# Patient Record
Sex: MALE | Race: WHITE | NOT HISPANIC OR LATINO | Employment: OTHER | URBAN - METROPOLITAN AREA
[De-identification: names, ages, dates, MRNs, and addresses within clinical notes are randomized per-mention and may not be internally consistent; named-entity substitution may affect disease eponyms.]

---

## 2017-03-06 RX ORDER — METOPROLOL SUCCINATE 50 MG/1
50 TABLET, EXTENDED RELEASE ORAL 2 TIMES DAILY
Qty: 180 TABLET | Refills: 0 | Status: SHIPPED | OUTPATIENT
Start: 2017-03-06 | End: 2017-04-05

## 2017-03-09 RX ORDER — METOPROLOL TARTRATE 50 MG/1
TABLET ORAL
Qty: 60 TABLET | Refills: 2 | Status: SHIPPED | OUTPATIENT
Start: 2017-03-09 | End: 2017-06-05 | Stop reason: SDUPTHER

## 2017-04-04 PROBLEM — I10 ESSENTIAL HYPERTENSION: Status: ACTIVE | Noted: 2017-04-04

## 2017-04-04 PROBLEM — I25.10 CORONARY ARTERY DISEASE INVOLVING NATIVE CORONARY ARTERY OF NATIVE HEART WITHOUT ANGINA PECTORIS: Status: ACTIVE | Noted: 2017-04-04

## 2017-04-04 PROBLEM — Z95.3 STATUS POST MITRAL VALVE REPLACEMENT WITH BIOPROSTHETIC VALVE: Status: ACTIVE | Noted: 2017-04-04

## 2017-04-04 PROBLEM — I05.9 MITRAL VALVE DISEASE: Status: ACTIVE | Noted: 2017-04-04

## 2017-04-04 PROBLEM — I77.9 CAROTID ARTERY DISEASE: Status: ACTIVE | Noted: 2017-04-04

## 2017-04-04 PROBLEM — E78.5 DYSLIPIDEMIA: Status: ACTIVE | Noted: 2017-04-04

## 2017-04-05 ENCOUNTER — OFFICE VISIT (OUTPATIENT)
Dept: CARDIOLOGY | Facility: CLINIC | Age: 72
End: 2017-04-05
Payer: MEDICARE

## 2017-04-05 VITALS
BODY MASS INDEX: 27.99 KG/M2 | DIASTOLIC BLOOD PRESSURE: 70 MMHG | WEIGHT: 189 LBS | HEIGHT: 69 IN | SYSTOLIC BLOOD PRESSURE: 116 MMHG | HEART RATE: 57 BPM

## 2017-04-05 DIAGNOSIS — I25.10 CORONARY ARTERY DISEASE INVOLVING NATIVE CORONARY ARTERY OF NATIVE HEART WITHOUT ANGINA PECTORIS: Primary | ICD-10-CM

## 2017-04-05 DIAGNOSIS — I10 ESSENTIAL HYPERTENSION: ICD-10-CM

## 2017-04-05 DIAGNOSIS — E78.5 DYSLIPIDEMIA: ICD-10-CM

## 2017-04-05 DIAGNOSIS — I05.9 MITRAL VALVE DISEASE: ICD-10-CM

## 2017-04-05 PROCEDURE — 99214 OFFICE O/P EST MOD 30 MIN: CPT | Mod: S$GLB,,, | Performed by: INTERNAL MEDICINE

## 2017-04-05 RX ORDER — AMLODIPINE BESYLATE 5 MG/1
5 TABLET ORAL NIGHTLY
Qty: 90 TABLET | Refills: 1 | Status: SHIPPED | OUTPATIENT
Start: 2017-04-05 | End: 2017-12-21 | Stop reason: SDUPTHER

## 2017-04-05 RX ORDER — FENOFIBRATE 134 MG/1
134 CAPSULE ORAL
COMMUNITY
End: 2021-10-24 | Stop reason: CLARIF

## 2017-04-05 RX ORDER — LISINOPRIL 40 MG/1
40 TABLET ORAL NIGHTLY
Qty: 90 TABLET | Refills: 1 | Status: SHIPPED | OUTPATIENT
Start: 2017-04-05 | End: 2017-09-18 | Stop reason: SDUPTHER

## 2017-04-05 RX ORDER — ATORVASTATIN CALCIUM 20 MG/1
20 TABLET, FILM COATED ORAL NIGHTLY
COMMUNITY
End: 2021-10-24 | Stop reason: CLARIF

## 2017-04-05 NOTE — MR AVS SNAPSHOT
[facility]  [address] [facility] [address]  Phone: [phone]                  Demarco Mercado   2017 3:15 PM   Office Visit    Description:  Male : 1945   Provider:  Wilmer Higginbotham MD   Department:  [facility]           Reason for Visit     Coronary Artery Disease     Valvular Heart Disease     Hyperlipidemia           Diagnoses this Visit        Comments    Coronary artery disease involving native coronary artery of native heart without angina pectoris    -  Primary     Essential hypertension         Mitral valve disease         Dyslipidemia                To Do List           Goals (5 Years of Data)     None      Follow-Up and Disposition     Return in about 6 months (around 10/5/2017).    Follow-up and Disposition History       These Medications        Disp Refills Start End    amlodipine (NORVASC) 5 MG tablet 90 tablet 1 2017     Take 1 tablet (5 mg total) by mouth every evening. - Oral    Pharmacy: [facility] [id number] [place] [address] AT [facility] Ph #: [phone]       lisinopril (PRINIVIL,ZESTRIL) 40 MG tablet 90 tablet 1 2017     Take 1 tablet (40 mg total) by mouth every evening. - Oral    Pharmacy: [facility] [place] [id number] [place] [address] AT [facility] Ph #: [phone]         [facility]     [facility] Care Line - 24/ Assistance  Unless otherwise directed by your provider, please contact [facility], our nurse care line that is available for 24/ assistance.     Registered nurses in the [facility] provide: appointment scheduling, clinical advisement, health education, and other advisory services.  Call: [phone] (toll free)               Medications           CHANGE how you are taking these medications     Start Taking Instead of    amlodipine (NORVASC) 5 MG tablet amlodipine (NORVASC) 5 MG tablet     "Dosage:  Take 1 tablet (5 mg total) by mouth every evening. Dosage:  Take 5 mg by mouth every evening.    Reason for Change:  Reorder     lisinopril (PRINIVIL,ZESTRIL) 40 MG tablet lisinopril (PRINIVIL,ZESTRIL) 20 MG tablet    Dosage:  Take 1 tablet (40 mg total) by mouth every evening. Dosage:  Take 20 mg by mouth 2 (two) times daily.    Reason for Change:  Reorder       STOP taking these medications     metoprolol succinate (TOPROL-XL) 50 MG 24 hr tablet Take 1 tablet (50 mg total) by mouth 2 (two) times daily. TAKE AM OF SURGERY           Verify that the below list of medications is an accurate representation of the medications you are currently taking.  If none reported, the list may be blank. If incorrect, please contact your healthcare provider. Carry this list with you in case of emergency.           Current Medications     alprazolam (XANAX) 0.5 MG tablet Has ample supply at home.    amlodipine (NORVASC) 5 MG tablet Take 1 tablet (5 mg total) by mouth every evening.    aspirin (ECOTRIN) 325 MG EC tablet Take 325 mg by mouth once daily.    atorvastatin (LIPITOR) 20 MG tablet Take 20 mg by mouth once daily.    esomeprazole (NEXIUM) 40 MG capsule Take 40 mg by mouth before breakfast. TAKE AM OF SURGERY    fenofibrate micronized (LOFIBRA) 134 MG Cap Take 134 mg by mouth daily with breakfast.    krill-om3-dha-epa-om6-lip-astx (KRILL OIL, OMEGA 3 AND 6,) 1,500-165-67.5 mg Cap Take 1 capsule by mouth once daily.    lisinopril (PRINIVIL,ZESTRIL) 40 MG tablet Take 1 tablet (40 mg total) by mouth every evening.    metoprolol tartrate (LOPRESSOR) 50 MG tablet TAKE 1 TABLET BY MOUTH TWICE DAILY           Clinical Reference Information           Your Vitals Were     BP Pulse Height Weight BMI    116/70 57 5' 9" (1.753 m) 85.7 kg (189 lb) 27.91 kg/m2      Blood Pressure          Most Recent Value    BP  116/70      Allergies as of 4/5/2017     No Known Allergies      Immunizations Administered on Date of Encounter - " "4/5/2017     None      Orders Placed During Today's Visit     Future Labs/Procedures Expected by Expires    Comprehensive metabolic panel  4/5/2017 6/4/2018    Lipid panel  4/5/2017 6/4/2018      Instructions      Controlling Your Cholesterol  Cholesterol is a waxy substance. It travels in your blood through the blood vessels. When you have high cholesterol, it builds up in the walls of the blood vessels. This makes the vessels narrower. Blood flow decreases. You are then at greater risk for having a heart attack or a stroke.  Good and bad cholesterol  Lipids are fats. Blood is mostly water. Fat and water don't mix. So our bodies need lipoproteins (lipids inside a protein shell) to carry the lipids. The protein shell carries its lipids through the bloodstream. There are two main kinds of lipoproteins:  · LDL (low-density lipoprotein) is known as "bad cholesterol." It mainly carries cholesterol. It delivers this cholesterol to body cells. Excess LDL cholesterol will build up in artery walls. This increases your risk for heart disease and stroke.  · HDL (high-density lipoprotein) is known as "good cholesterol." This protein shell collects excess cholesterol that LDLs have left behind on blood vessel walls. That's why high levels of HDL cholesterol can decrease your risk of heart disease and stroke.  Controlling cholesterol levels  Total cholesterol includes LDL and HDL cholesterol, as well as other fats in the bloodstream. If your total cholesterol is high, follow the steps below to help lower your total cholesterol level:  · Eat less unhealthy fat:  ¨ Cut back on saturated fats and trans (also called hydrogenated) fats by selecting lean cuts of meat, low-fat dairy, and using oils instead of solid fats. Limit baked goods, processed meats, and fried foods. A diet thats high in these fats increases your bad cholesterol. It's not enough to just cut back on foods containing cholesterol.  ¨ Eat about 2 servings of fish " per week. Most fish contain omega-3 fatty acids. These help lower blood cholesterol.  ¨ Eat more whole grains and soluble fiber (such as oat bran). These lower overall cholesterol.  · Be active:  ¨ Choose an activity you enjoy. Walking, swimming, and riding a bike are some good ways to be active.  ¨ Start at a level where you feel comfortable. Increase your time and pace a little each week.  ¨ Work up to 40 minutes of moderate to high intensity physical activity at least 3 to 4 days per week.  ¨ Remember, some activity is better than none.  ¨ If you haven't been exercising regularly, start slowly. Check with your doctor to make sure the exercise plan is right for you.  · Quit smoking. Quitting smoking can improve your lipid levels. It also lowers your risk for heart disease and stroke.  · Weight management. If you are overweight or obese, your health care provider will work with you to lose weight and lower your BMI (body mass index) to a normal or near-normal level. Making diet changes and increasing physical activity can help.  · Take medication as directed. Many people need medication to get their LDL levels to a safe level. Medication to lower cholesterol levels is effective and safe. (But taking medication is not a substitute for exercise or watching your diet!) Your doctor can tell you whether you might benefit from a cholesterol-lowering medication.  Date Last Reviewed: 5/11/2015 © 2000-2016 [employer]. [address] [place] [id number]. All rights reserved. This information is not intended as a substitute for professional medical care. Always follow your healthcare professional's instructions.        Heart Disease Education    The heart beats 60 to 100 times per minute, 24 hours a day. This equals almost 1000,000 times a day. It pumps blood with oxygen and nutrients to the tissues and organs of the body. But the heart is a muscle and needs its own supply of blood. Blood flow to the  heart is supplied by the coronary arteries. Coronary artery disease (atherosclerosis) is a result of cholesterol, saturated fat, and calcium deposits (plaques) that build up inside the walls. This causes inflammation within the coronary arteries. These plaques narrow the artery and reduce blood flow to the heart muscle. The reduction in blood flow to the heart muscle decreases oxygen supply to the heart. If the narrowing is significant enough, the oxygen supply to one or more regions of the heart can be temporarily or permanently shut down. This can cause chest pain, and possibly death of heart tissue (heart attack).  Types of chest pain  Angina is the name for pain in the heart muscle. Angina is a warning sign of serious heart disease. When untreated it can lead to a heart attack, also known as acute myocardial infarction, or AMI. Angina occurs when there is not enough blood and oxygen flowing to the heart for the amount of work it is doing. This most often happens during physical exertion, when the heart is working hardest. It is usually relieved by rest or nitroglycerin. Angina may also occur after a large meal when extra blood is sent to the digestive organs and less goes to the heart. In the case of advanced or unstable heart disease, angina can occur at rest or awaken you from sleep. Angina usually lasts from a few minutes up to 20 minutes or more. When treated early, the effects of angina can be reversed without permanent damage to the heart. Angina is a serious condition and needs to be evaluated by a medical professional immediately.  There are two types of angina -- stable and unstable:  · Stable angina usually occurs with a predictable level of activity. Being stable, its character, severity, and occurrence do not change much over time. It usually starts with activity, and resolves with rest or taking your medicine as instructed by your doctor. The symptoms usually do not last long.  · Unstable angina  "changes or gets worse over time. It is different from whatever you are used to. It may feel different or worse, begin without cause, occur with exercise or exertion, wake you up from sleep, and last longer. It may not respond in the same way as it does when you take your usual medicines for an attack. This type of angina can be a warning sign of an impending heart attack.     A heart attack is usually the result of a blood clot that suddenly forms in a coronary artery that has been narrowed with plaque. When this occurs, blood flow may be cut off to a part of the heart muscle, causing the cells to die. This weakens the pumping action of the heart, which affects the delivery of blood to all the other organs in the body including the brain. This damage is not reversible. However, early treatment can limit the amount of damage.  The pain you feel with angina and a heart attack may have a similar quality. However, it is usually different in intensity and duration. Here are some typical descriptions of a heart attack:  · It is most often experienced as a squeezing, crushing, pressure-like sensation in the center of the chest.  · It is sometimes described as something heavy sitting on my chest.  · It may feel more like a bad case of indigestion.  · The pain may spread from the chest to the arm, shoulder, throat or jaw.  · Sometimes the pain is not felt in the chest at all, but only in the arm, shoulder, throat or jaw.  · There may also be nausea, vomiting, dizziness or light-headedness, sweating and trouble breathing.  · Palpitations, or your heart beating rapidly  · A new, irregular heart beat  · Unexplained weakness  You may not be able to tell the difference between "bad" angina and a heart attack at home. Seek help if your symptoms are different than usual. Do not be in denial or just try to "tough it out."  Call 911  This is the fastest and safest way to get to the emergency department. The paramedics can also " start treatment on the way to the hospital, saving valuable time for your heart.  · If the angina gets worse, if it continues, or if it stops and returns, call 911 immediately. Do not delay. You may be having a heart attack.  · After you call 911, take a second tablet or spray unless instructed otherwise. When repeating doses, sit down if possible, because it can make you feel lightheaded or dizzy. Wait another 5 minutes. If the angina still does not go away, take a third tablet or spray. Do not take more than 3 tablets or sprays within 15 minutes. Stay on the phone with 911 for further instruction.  · Your healthcare provider may give you slightly different instructions than those above. If so, follow them carefully.  Do not wait until symptoms become severe to call 911.  Other reasons to call 911 include:  · Trouble breathing  · Feeling lightheaded, faint, or dizzy  · Rapid heart beat  · Slower than usual heart rate compared to your normal  · Angina with weakness, dizziness, fainting, heavy sweating, nausea, or vomiting  · Extreme drowsiness, confusion  · Weakness of an arm or leg or one side of the face  · Difficulty with speech or vision  When to seek medical care  Remember, the signs and symptoms of a heart attack are not always like they are on TV. Sometimes they are not so obvious. You may only feel weak, or just not right. If it is not clear or if you have any doubt, call for advice.  · Seek help if there is a change in the type of pain, if it feels different, or if your symptoms are mild.  · Do not drive yourself. Have someone else drive you. If no one can drive, call 911.  · Do not delay. Fast diagnosis and treatment can prevent or limit the amount of heart damage during a heart attack.  · Do not go to your doctor's office or a clinic as they may not be able to provide all the testing and treatment required for this condition.  · If your doctor has given you medicine to take when symptoms occur, take them  "but don't delay getting help trying to locate medicines.  What happens in the emergency department  The emergency department is connected to your local emergency medical system (EMS) through 911. That's why during a cardiac emergency, calling 911 is the fastest way to get help. The goal of the emergency department is to rapidly screen, evaluate, and treat people.  Once you are there, an electrocardiogram (ECG or heart tracing) will be done. Blood samples may be taken to look for the presence of heart enzymes that leak from damaged heart cells and show if a heart attack is occurring. You will often be evaluated by a heart specialist (cardiologist) who decides the best course of action. In the case of severe angina or early heart attack, and depending on the circumstances, powerful "clot busting" medicines can be used to dissolve blood clots in the coronary artery. In other cases, you may be taken to a cardiac catheterization lab. Here, a tiny balloon-tipped catheter is advanced through blood vessels to the heart. There the balloon is inflated pushing open the blood vessel restoring blood flow.  Risk factors for heart disease  Risk factors for heart disease are a combination of genetic and lifestyle. Many risk factors work by either directly or indirectly damaging the blood vessels of the heart, or by increasing the risk of forming blood or cholesterol clots, which then clog up and block the arteries.     Examples of physical lifestyle risk factors:  · Cigarette smoking  · High blood pressure  · High blood cholesterol  · Use of stimulant drugs such as cocaine, crack, and amphetamines  · Eating a high-fat, high-cholesterol meal  · Diabetes   · Obesity which increases risk for diabetes and high blood pressure  · Lack of regular physical activity     Examples of emotional lifestyle factors:  · Chronic high stress levels release stress hormones. These raise blood pressure and cholesterol level and makes blood clot more " easily.  · Held-in anger, hostile or cynical attitude  · Social and emotional isolation, lack of intimacy  · Loss of relationship  · Depression  Other factors that increase the risk of heart attack that you cannot control :  · Age. The older you get beyond 40, the greater is your risk of significant coronary artery disease.  · Gender. More men than women get heart disease; but once past menopause, women who are not taking estrogen replacement have the same risk as men for a heart attack.  · Family history. If your mother, father, brother or sister has coronary artery disease, your risk of having it is higher than a person your age without this family history.  What can you do to decrease your risk  To reduce your risk of heart disease:  · Get regular checkups with your doctor.  · Take your medicines for blood pressure, cholesterol or diabetes as directed.  · Watch your diet. Eat a heart healthy diet choosing fresh foods, less salt, cholesterol, and fat  · Stop smoking. Get help if needed.  · Get regular exercise.  · Manage stress.  · Carry a list of medicines and doses in your wallet.  Date Last Reviewed: 12/30/2015  © 8729-9459 [employer]. [address] [place] [address]. All rights reserved. This information is not intended as a substitute for professional medical care. Always follow your healthcare professional's instructions.        Heart Valve Problems  Your hearts job is to pump blood through your body. That job starts with pumping blood through the heart itself. Inside your heart, blood passes through a series of one-way cotto called valves. If a valve works poorly, not enough blood moves forward. A problem heart valve may not open wide enough, not close tightly enough, or both. In any case, not enough blood is sent to the heart muscle or out to the body.  Symptoms of heart valve problems  You can have a problem valve for decades yet have no symptoms. If you do have symptoms, they  may come on so slowly that you barely notice them. In other cases, though, symptoms appear suddenly. You might have one or more of these symptoms:  · Problems breathing when you lie down, exert yourself, or get stressed emotionally  · Pain, pressure, tightness, or numbness in your chest, neck, back, or arms (angina)  · Feeling dizzy, faint, or lightheaded  · Tiredness, especially with activity or as the day goes on  · Waking up at night coughing or short of breath  · A fast, pounding, or irregular heartbeat  · A fluttering feeling in your chest  · Swollen ankles or feet  · Fainting, especially upon standing up or with exertion  Problems opening (stenosis)    When a valve doesnt open all the way, the problem is called stenosis. The leaflets may be stuck together or too stiff to open fully. When the valve doesnt open fully, blood has to flow through a smaller opening. So the heart muscle has to work harder to push the blood through the valve.  Problems closing (regurgitation)    When a valve doesnt close tightly enough and blood leaks backward through the valve, the problem is called regurgitation or insufficiency. The valve itself may be described as leaky. Leaflets may fit together poorly. Or the structures that support them may be torn. Some blood leaks through the valve back into the chamber it just left. So the heart has to move that blood twice. This can result in heart muscle damage.  Common causes of valve problems  People of any age can have heart valve trouble. You may have been born with a problem valve. Or a valve may have worn out as youve aged. It may not be possible to pinpoint what caused your valve problem. But common causes include:  · Buildup of calcium or scar tissue on a valve  · Rheumatic fever and certain other infections and diseases  · High blood pressure  · Other heart problems, such as coronary artery disease  · Congenital defects of the heart valves      Date Last Reviewed: 6/1/2016  ©  8769-2291 [employer]. [address] [place] [address], [place] [address]. All rights reserved. This information is not intended as a substitute for professional medical care. Always follow your healthcare professional's instructions.             Language Assistance Services     ATTENTION: Language assistance services are available, free of charge. Please call [phone].      ATENCIÓN: Si habla español, tiene a varela disposición servicios gratuitos de asistencia lingüística. Llame al [phone].     CHÚ Ý: N?u b?n nói Ti?ng Vi?t, có các d?ch v? h? tr? ngôn ng? mi?n phí dành cho b?n. G?i s? [phone].         [facility] complies with applicable Federal civil rights laws and does not discriminate on the basis of race, color, national origin, age, disability, or sex.

## 2017-04-05 NOTE — PATIENT INSTRUCTIONS
"  Controlling Your Cholesterol  Cholesterol is a waxy substance. It travels in your blood through the blood vessels. When you have high cholesterol, it builds up in the walls of the blood vessels. This makes the vessels narrower. Blood flow decreases. You are then at greater risk for having a heart attack or a stroke.  Good and bad cholesterol  Lipids are fats. Blood is mostly water. Fat and water don't mix. So our bodies need lipoproteins (lipids inside a protein shell) to carry the lipids. The protein shell carries its lipids through the bloodstream. There are two main kinds of lipoproteins:  · LDL (low-density lipoprotein) is known as "bad cholesterol." It mainly carries cholesterol. It delivers this cholesterol to body cells. Excess LDL cholesterol will build up in artery walls. This increases your risk for heart disease and stroke.  · HDL (high-density lipoprotein) is known as "good cholesterol." This protein shell collects excess cholesterol that LDLs have left behind on blood vessel walls. That's why high levels of HDL cholesterol can decrease your risk of heart disease and stroke.  Controlling cholesterol levels  Total cholesterol includes LDL and HDL cholesterol, as well as other fats in the bloodstream. If your total cholesterol is high, follow the steps below to help lower your total cholesterol level:  · Eat less unhealthy fat:  ¨ Cut back on saturated fats and trans (also called hydrogenated) fats by selecting lean cuts of meat, low-fat dairy, and using oils instead of solid fats. Limit baked goods, processed meats, and fried foods. A diet thats high in these fats increases your bad cholesterol. It's not enough to just cut back on foods containing cholesterol.  ¨ Eat about 2 servings of fish per week. Most fish contain omega-3 fatty acids. These help lower blood cholesterol.  ¨ Eat more whole grains and soluble fiber (such as oat bran). These lower overall cholesterol.  · Be active:  ¨ Choose an " activity you enjoy. Walking, swimming, and riding a bike are some good ways to be active.  ¨ Start at a level where you feel comfortable. Increase your time and pace a little each week.  ¨ Work up to 40 minutes of moderate to high intensity physical activity at least 3 to 4 days per week.  ¨ Remember, some activity is better than none.  ¨ If you haven't been exercising regularly, start slowly. Check with your doctor to make sure the exercise plan is right for you.  · Quit smoking. Quitting smoking can improve your lipid levels. It also lowers your risk for heart disease and stroke.  · Weight management. If you are overweight or obese, your health care provider will work with you to lose weight and lower your BMI (body mass index) to a normal or near-normal level. Making diet changes and increasing physical activity can help.  · Take medication as directed. Many people need medication to get their LDL levels to a safe level. Medication to lower cholesterol levels is effective and safe. (But taking medication is not a substitute for exercise or watching your diet!) Your doctor can tell you whether you might benefit from a cholesterol-lowering medication.  Date Last Reviewed: 5/11/2015  © 4478-4800 [employer]. [address] [place] [address], [place] [address]. All rights reserved. This information is not intended as a substitute for professional medical care. Always follow your healthcare professional's instructions.        Heart Disease Education    The heart beats 60 to 100 times per minute, 24 hours a day. This equals almost 1000,000 times a day. It pumps blood with oxygen and nutrients to the tissues and organs of the body. But the heart is a muscle and needs its own supply of blood. Blood flow to the heart is supplied by the coronary arteries. Coronary artery disease (atherosclerosis) is a result of cholesterol, saturated fat, and calcium deposits (plaques) that build up inside the walls. This causes  inflammation within the coronary arteries. These plaques narrow the artery and reduce blood flow to the heart muscle. The reduction in blood flow to the heart muscle decreases oxygen supply to the heart. If the narrowing is significant enough, the oxygen supply to one or more regions of the heart can be temporarily or permanently shut down. This can cause chest pain, and possibly death of heart tissue (heart attack).  Types of chest pain  Angina is the name for pain in the heart muscle. Angina is a warning sign of serious heart disease. When untreated it can lead to a heart attack, also known as acute myocardial infarction, or AMI. Angina occurs when there is not enough blood and oxygen flowing to the heart for the amount of work it is doing. This most often happens during physical exertion, when the heart is working hardest. It is usually relieved by rest or nitroglycerin. Angina may also occur after a large meal when extra blood is sent to the digestive organs and less goes to the heart. In the case of advanced or unstable heart disease, angina can occur at rest or awaken you from sleep. Angina usually lasts from a few minutes up to 20 minutes or more. When treated early, the effects of angina can be reversed without permanent damage to the heart. Angina is a serious condition and needs to be evaluated by a medical professional immediately.  There are two types of angina -- stable and unstable:  · Stable angina usually occurs with a predictable level of activity. Being stable, its character, severity, and occurrence do not change much over time. It usually starts with activity, and resolves with rest or taking your medicine as instructed by your doctor. The symptoms usually do not last long.  · Unstable angina changes or gets worse over time. It is different from whatever you are used to. It may feel different or worse, begin without cause, occur with exercise or exertion, wake you up from sleep, and last longer.  "It may not respond in the same way as it does when you take your usual medicines for an attack. This type of angina can be a warning sign of an impending heart attack.     A heart attack is usually the result of a blood clot that suddenly forms in a coronary artery that has been narrowed with plaque. When this occurs, blood flow may be cut off to a part of the heart muscle, causing the cells to die. This weakens the pumping action of the heart, which affects the delivery of blood to all the other organs in the body including the brain. This damage is not reversible. However, early treatment can limit the amount of damage.  The pain you feel with angina and a heart attack may have a similar quality. However, it is usually different in intensity and duration. Here are some typical descriptions of a heart attack:  · It is most often experienced as a squeezing, crushing, pressure-like sensation in the center of the chest.  · It is sometimes described as something heavy sitting on my chest.  · It may feel more like a bad case of indigestion.  · The pain may spread from the chest to the arm, shoulder, throat or jaw.  · Sometimes the pain is not felt in the chest at all, but only in the arm, shoulder, throat or jaw.  · There may also be nausea, vomiting, dizziness or light-headedness, sweating and trouble breathing.  · Palpitations, or your heart beating rapidly  · A new, irregular heart beat  · Unexplained weakness  You may not be able to tell the difference between "bad" angina and a heart attack at home. Seek help if your symptoms are different than usual. Do not be in denial or just try to "tough it out."  Call 911  This is the fastest and safest way to get to the emergency department. The paramedics can also start treatment on the way to the hospital, saving valuable time for your heart.  · If the angina gets worse, if it continues, or if it stops and returns, call 911 immediately. Do not delay. You may be having a " heart attack.  · After you call 911, take a second tablet or spray unless instructed otherwise. When repeating doses, sit down if possible, because it can make you feel lightheaded or dizzy. Wait another 5 minutes. If the angina still does not go away, take a third tablet or spray. Do not take more than 3 tablets or sprays within 15 minutes. Stay on the phone with 911 for further instruction.  · Your healthcare provider may give you slightly different instructions than those above. If so, follow them carefully.  Do not wait until symptoms become severe to call 911.  Other reasons to call 911 include:  · Trouble breathing  · Feeling lightheaded, faint, or dizzy  · Rapid heart beat  · Slower than usual heart rate compared to your normal  · Angina with weakness, dizziness, fainting, heavy sweating, nausea, or vomiting  · Extreme drowsiness, confusion  · Weakness of an arm or leg or one side of the face  · Difficulty with speech or vision  When to seek medical care  Remember, the signs and symptoms of a heart attack are not always like they are on TV. Sometimes they are not so obvious. You may only feel weak, or just not right. If it is not clear or if you have any doubt, call for advice.  · Seek help if there is a change in the type of pain, if it feels different, or if your symptoms are mild.  · Do not drive yourself. Have someone else drive you. If no one can drive, call 911.  · Do not delay. Fast diagnosis and treatment can prevent or limit the amount of heart damage during a heart attack.  · Do not go to your doctor's office or a clinic as they may not be able to provide all the testing and treatment required for this condition.  · If your doctor has given you medicine to take when symptoms occur, take them but don't delay getting help trying to locate medicines.  What happens in the emergency department  The emergency department is connected to your local emergency medical system (EMS) through 911. That's why  "during a cardiac emergency, calling 911 is the fastest way to get help. The goal of the emergency department is to rapidly screen, evaluate, and treat people.  Once you are there, an electrocardiogram (ECG or heart tracing) will be done. Blood samples may be taken to look for the presence of heart enzymes that leak from damaged heart cells and show if a heart attack is occurring. You will often be evaluated by a heart specialist (cardiologist) who decides the best course of action. In the case of severe angina or early heart attack, and depending on the circumstances, powerful "clot busting" medicines can be used to dissolve blood clots in the coronary artery. In other cases, you may be taken to a cardiac catheterization lab. Here, a tiny balloon-tipped catheter is advanced through blood vessels to the heart. There the balloon is inflated pushing open the blood vessel restoring blood flow.  Risk factors for heart disease  Risk factors for heart disease are a combination of genetic and lifestyle. Many risk factors work by either directly or indirectly damaging the blood vessels of the heart, or by increasing the risk of forming blood or cholesterol clots, which then clog up and block the arteries.     Examples of physical lifestyle risk factors:  · Cigarette smoking  · High blood pressure  · High blood cholesterol  · Use of stimulant drugs such as cocaine, crack, and amphetamines  · Eating a high-fat, high-cholesterol meal  · Diabetes   · Obesity which increases risk for diabetes and high blood pressure  · Lack of regular physical activity     Examples of emotional lifestyle factors:  · Chronic high stress levels release stress hormones. These raise blood pressure and cholesterol level and makes blood clot more easily.  · Held-in anger, hostile or cynical attitude  · Social and emotional isolation, lack of intimacy  · Loss of relationship  · Depression  Other factors that increase the risk of heart attack that you " cannot control :  · Age. The older you get beyond 40, the greater is your risk of significant coronary artery disease.  · Gender. More men than women get heart disease; but once past menopause, women who are not taking estrogen replacement have the same risk as men for a heart attack.  · Family history. If your mother, father, brother or sister has coronary artery disease, your risk of having it is higher than a person your age without this family history.  What can you do to decrease your risk  To reduce your risk of heart disease:  · Get regular checkups with your doctor.  · Take your medicines for blood pressure, cholesterol or diabetes as directed.  · Watch your diet. Eat a heart healthy diet choosing fresh foods, less salt, cholesterol, and fat  · Stop smoking. Get help if needed.  · Get regular exercise.  · Manage stress.  · Carry a list of medicines and doses in your wallet.  Date Last Reviewed: 12/30/2015  © 6269-1012 [employer] [address] [place] [address]. All rights reserved. This information is not intended as a substitute for professional medical care. Always follow your healthcare professional's instructions.        Heart Valve Problems  Your hearts job is to pump blood through your body. That job starts with pumping blood through the heart itself. Inside your heart, blood passes through a series of one-way cotto called valves. If a valve works poorly, not enough blood moves forward. A problem heart valve may not open wide enough, not close tightly enough, or both. In any case, not enough blood is sent to the heart muscle or out to the body.  Symptoms of heart valve problems  You can have a problem valve for decades yet have no symptoms. If you do have symptoms, they may come on so slowly that you barely notice them. In other cases, though, symptoms appear suddenly. You might have one or more of these symptoms:  · Problems breathing when you lie down, exert yourself, or  get stressed emotionally  · Pain, pressure, tightness, or numbness in your chest, neck, back, or arms (angina)  · Feeling dizzy, faint, or lightheaded  · Tiredness, especially with activity or as the day goes on  · Waking up at night coughing or short of breath  · A fast, pounding, or irregular heartbeat  · A fluttering feeling in your chest  · Swollen ankles or feet  · Fainting, especially upon standing up or with exertion  Problems opening (stenosis)    When a valve doesnt open all the way, the problem is called stenosis. The leaflets may be stuck together or too stiff to open fully. When the valve doesnt open fully, blood has to flow through a smaller opening. So the heart muscle has to work harder to push the blood through the valve.  Problems closing (regurgitation)    When a valve doesnt close tightly enough and blood leaks backward through the valve, the problem is called regurgitation or insufficiency. The valve itself may be described as leaky. Leaflets may fit together poorly. Or the structures that support them may be torn. Some blood leaks through the valve back into the chamber it just left. So the heart has to move that blood twice. This can result in heart muscle damage.  Common causes of valve problems  People of any age can have heart valve trouble. You may have been born with a problem valve. Or a valve may have worn out as youve aged. It may not be possible to pinpoint what caused your valve problem. But common causes include:  · Buildup of calcium or scar tissue on a valve  · Rheumatic fever and certain other infections and diseases  · High blood pressure  · Other heart problems, such as coronary artery disease  · Congenital defects of the heart valves      Date Last Reviewed: 6/1/2016  © 6869-1840 [employer]. [address] [place] [address], [place] [address]. All rights reserved. This information is not intended as a substitute for professional medical care. Always follow your  healthcare professional's instructions.

## 2017-04-05 NOTE — PROGRESS NOTES
Subjective:    Patient ID:  Demarco Mercado is a 72 y.o. male who presents for Coronary Artery Disease; Valvular Heart Disease; and Hyperlipidemia      HPI  PT WAS FOLLOWED BY DR SPENCER,HAD CABG, AVR 7 YEARS AGO,  AVAILABLE RECORDS REVIEWED, NO LABS NOW, HAD SOME FOR PCP DR MILLER, , DOING OK, ACTIVE WORKS IN CONSTRUCTION, SEE ROS  Past Medical History:   Diagnosis Date    Cancer     MELANOMA SKIN    Carotid artery occlusion     Encounter for blood transfusion     Heart murmur     Hyperlipidemia     Hypertension     Stenosis of aortic and mitral valves     Valvular regurgitation      Past Surgical History:   Procedure Laterality Date    CARDIAC CATHETERIZATION      CHOLECYSTECTOMY      CORONARY ARTERY BYPASS GRAFT      HERNIA REPAIR      MITRAL VALVE REPLACEMENT      TONSILLECTOMY      VASCULAR SURGERY       Family History   Problem Relation Age of Onset    Hyperlipidemia Mother     Hypertension Mother     Diabetes Mother     Heart disease Mother     Heart disease Father      Social History     Social History    Marital status:      Spouse name: N/A    Number of children: N/A    Years of education: N/A     Social History Main Topics    Smoking status: Former Smoker     Quit date: 4/5/1970    Smokeless tobacco: Never Used    Alcohol use No    Drug use: No    Sexual activity: Not Asked     Other Topics Concern    None     Social History Narrative       Review of patient's allergies indicates:  No Known Allergies    Current Outpatient Prescriptions:     alprazolam (XANAX) 0.5 MG tablet, Has ample supply at home. (Patient taking differently: 1 mg nightly as needed. Has ample supply at home.), Disp: , Rfl:     amlodipine (NORVASC) 5 MG tablet, Take 1 tablet (5 mg total) by mouth every evening., Disp: 90 tablet, Rfl: 1    aspirin (ECOTRIN) 325 MG EC tablet, Take 325 mg by mouth once daily., Disp: , Rfl:     atorvastatin (LIPITOR) 20 MG tablet, Take 20 mg by mouth once daily., Disp:  , Rfl:     esomeprazole (NEXIUM) 40 MG capsule, Take 40 mg by mouth before breakfast. TAKE AM OF SURGERY, Disp: , Rfl:     fenofibrate micronized (LOFIBRA) 134 MG Cap, Take 134 mg by mouth daily with breakfast., Disp: , Rfl:     krill-om3-dha-epa-om6-lip-astx (KRILL OIL, OMEGA 3 AND 6,) 1,500-165-67.5 mg Cap, Take 1 capsule by mouth once daily., Disp: , Rfl:     lisinopril (PRINIVIL,ZESTRIL) 20 MG tablet, Take 20 mg by mouth 2 (two) times daily., Disp: , Rfl:     metoprolol tartrate (LOPRESSOR) 50 MG tablet, TAKE 1 TABLET BY MOUTH TWICE DAILY, Disp: 60 tablet, Rfl: 2    Review of Systems   Constitution: Negative for chills, diaphoresis, fever, weakness, malaise/fatigue, night sweats, weight gain and weight loss.   HENT: Negative for congestion and hearing loss.    Eyes: Negative for blurred vision, discharge and visual disturbance.   Cardiovascular: Negative for chest pain, claudication, cyanosis, dyspnea on exertion, irregular heartbeat, leg swelling, near-syncope, orthopnea, palpitations, paroxysmal nocturnal dyspnea and syncope.   Respiratory: Negative for cough, hemoptysis, shortness of breath, sleep disturbances due to breathing, sputum production and wheezing.    Endocrine: Negative for cold intolerance and heat intolerance.   Hematologic/Lymphatic: Negative for adenopathy. Does not bruise/bleed easily.   Skin: Negative for color change, itching, nail changes and rash.   Musculoskeletal: Positive for back pain (OCC). Negative for falls.   Gastrointestinal: Negative for bloating, abdominal pain, constipation, dysphagia, flatus, heartburn, hematemesis, jaundice and melena.   Genitourinary: Positive for nocturia. Negative for dysuria, flank pain, frequency and incomplete emptying.   Neurological: Negative for brief paralysis, difficulty with concentration, excessive daytime sleepiness, dizziness, focal weakness, light-headedness, loss of balance, numbness, paresthesias and tremors.   Psychiatric/Behavioral:  "Negative for altered mental status, depression and substance abuse. The patient is not nervous/anxious.    Allergic/Immunologic: Negative for persistent infections.        Objective:      Vitals:    04/05/17 1529   BP: 116/70   Pulse: (!) 57   Weight: 85.7 kg (189 lb)   Height: 5' 9" (1.753 m)   PainSc: 0-No pain     Body mass index is 27.91 kg/(m^2).    Physical Exam   Constitutional: He is oriented to person, place, and time. He appears well-developed and well-nourished.   HENT:   Head: Normocephalic and atraumatic.   Mouth/Throat: Oropharynx is clear and moist.   Eyes: Conjunctivae and EOM are normal. Pupils are equal, round, and reactive to light.   Neck: Neck supple. Normal carotid pulses, no hepatojugular reflux and no JVD present. Carotid bruit is not present. No tracheal deviation, no edema and no erythema present. No thyromegaly present.   Cardiovascular: Normal rate and regular rhythm.  PMI is not displaced.  Exam reveals no gallop, no distant heart sounds, no friction rub, no midsystolic click and no decreased pulses.    Murmur heard.   Systolic murmur is present with a grade of 2/6  at the upper right sternal border, lower left sternal border  Pulses:       Carotid pulses are 2+ on the right side, and 2+ on the left side.       Radial pulses are 2+ on the right side, and 2+ on the left side.        Femoral pulses are 2+ on the right side, and 2+ on the left side.       Popliteal pulses are 2+ on the right side, and 2+ on the left side.        Dorsalis pedis pulses are 2+ on the right side, and 2+ on the left side.        Posterior tibial pulses are 2+ on the right side, and 2+ on the left side.   Pulmonary/Chest: Effort normal and breath sounds normal.   STERNOTOMY SCAR   Abdominal: Soft. Bowel sounds are normal. He exhibits no distension and no mass. There is no hepatosplenomegaly. There is no tenderness. There is no CVA tenderness.   Musculoskeletal: Normal range of motion. He exhibits no edema. "   Lymphadenopathy:     He has no cervical adenopathy.   Neurological: He is alert and oriented to person, place, and time. No cranial nerve deficit. Coordination normal.   Skin: Skin is warm and dry. No erythema.   Psychiatric: He has a normal mood and affect. His speech is normal and behavior is normal. Judgment and thought content normal.               ..    Chemistry        Component Value Date/Time     12/15/2015 1210    K 4.6 12/15/2015 1210     12/15/2015 1210    CO2 28 12/15/2015 1210    BUN 20 12/15/2015 1210    CREATININE 1.04 12/15/2015 1210    GLU 87 12/15/2015 1210        Component Value Date/Time    CALCIUM 9.1 12/15/2015 1210    ALKPHOS 194 (H) 12/15/2015 1210    AST 25 12/15/2015 1210    ALT 43 12/15/2015 1210    BILITOT 0.7 12/15/2015 1210            ..No results found for: CHOL  No results found for: HDL  No results found for: LDLCALC  No results found for: TRIG  No results found for: CHOLHDL  ..  Lab Results   Component Value Date    WBC 5.43 12/15/2015    HGB 14.0 12/15/2015    HCT 43.8 12/15/2015    MCV 89 12/15/2015     12/15/2015       Test(s) Reviewed  I have reviewed the following in detail:  [] Stress test   [] Angiography   [] Echocardiogram   [] Labs   [] Other:       Assessment:         ICD-10-CM ICD-9-CM   1. Coronary artery disease involving native coronary artery of native heart without angina pectoris I25.10 414.01   2. Essential hypertension I10 401.9   3. Mitral valve disease I05.9 394.9   4. Dyslipidemia E78.5 272.4     Problem List Items Addressed This Visit     Mitral valve disease    Relevant Orders    Comprehensive metabolic panel    Lipid panel    Dyslipidemia    Relevant Orders    Comprehensive metabolic panel    Lipid panel    Coronary artery disease involving native coronary artery of native heart without angina pectoris - Primary    Relevant Orders    Comprehensive metabolic panel    Lipid panel    Essential hypertension    Relevant Orders     Comprehensive metabolic panel    Lipid panel           Plan:         CHANGE ZESTRIL FROM 20 BID TO 40 NIGHTLY, ALL CV CLINICALLY STABLE, NO ANGINA, NO HF, NO TIA, NO CLINICAL ARRHYTHMIA,CONTINUE CURRENT MEDS, EDUCATION, DIET, EXERCISE, RTC IN 6 MO WITH LABS  Coronary artery disease involving native coronary artery of native heart without angina pectoris  -     Comprehensive metabolic panel; Future; Expected date: 4/5/17  -     Lipid panel; Future; Expected date: 4/5/17    Essential hypertension  -     Comprehensive metabolic panel; Future; Expected date: 4/5/17  -     Lipid panel; Future; Expected date: 4/5/17    Mitral valve disease  -     Comprehensive metabolic panel; Future; Expected date: 4/5/17  -     Lipid panel; Future; Expected date: 4/5/17    Dyslipidemia  -     Comprehensive metabolic panel; Future; Expected date: 4/5/17  -     Lipid panel; Future; Expected date: 4/5/17    Other orders  -     amlodipine (NORVASC) 5 MG tablet; Take 1 tablet (5 mg total) by mouth every evening.  Dispense: 90 tablet; Refill: 1  RTC Low level/low impact aerobic exercise 5x's/wk. Heart healthy diet and risk factor modification.    See labs and med orders.    Aerobic exercise 5x's/wk. Heart healthy diet and risk factor modification.    See labs and med orders.

## 2017-04-10 ENCOUNTER — TELEPHONE (OUTPATIENT)
Dept: CARDIOLOGY | Facility: CLINIC | Age: 72
End: 2017-04-10

## 2017-06-05 RX ORDER — METOPROLOL TARTRATE 50 MG/1
TABLET ORAL
Qty: 180 TABLET | Refills: 0 | Status: SHIPPED | OUTPATIENT
Start: 2017-06-05 | End: 2017-09-05 | Stop reason: SDUPTHER

## 2017-06-28 ENCOUNTER — TELEPHONE (OUTPATIENT)
Dept: CARDIOLOGY | Facility: CLINIC | Age: 72
End: 2017-06-28

## 2017-06-28 NOTE — TELEPHONE ENCOUNTER
----- Message from Kayley Saldana sent at 6/28/2017  3:23 PM CDT -----  Contact: pt [phone]  Patient called and asked  If  You will send his labs to be  Done at [facility] to be done tomorrow morning. Please call the patient back at [phone]

## 2017-09-05 RX ORDER — METOPROLOL TARTRATE 50 MG/1
TABLET ORAL
Qty: 180 TABLET | Refills: 0 | Status: SHIPPED | OUTPATIENT
Start: 2017-09-05 | End: 2017-12-21 | Stop reason: SDUPTHER

## 2017-09-18 RX ORDER — LISINOPRIL 40 MG/1
40 TABLET ORAL NIGHTLY
Qty: 90 TABLET | Refills: 1 | Status: SHIPPED | OUTPATIENT
Start: 2017-09-18 | End: 2018-03-28 | Stop reason: SDUPTHER

## 2017-09-18 NOTE — TELEPHONE ENCOUNTER
----- Message from Natalia Pandya sent at 9/18/2017 12:48 PM CDT -----  Contact: Judy Mercado Spouse   X  1st Request  _  2nd Request  _  3rd Request        Who: Judy Mercado Spouse     Why: Pt wife is calling to say that pt bottle says lisinopril 20 MG tablet.  Pt wife says pt says that he would like to stick with that and continue taking twice daily.  Please contact pt wife to further discuss and advise.    What Number to Call Back: [phone]    When to Expect a call back: (With in 24 hours)      [facility] [id number] [place] [address] AT [facility]

## 2017-09-21 RX ORDER — LISINOPRIL 40 MG/1
40 TABLET ORAL NIGHTLY
Qty: 90 TABLET | Refills: 1 | OUTPATIENT
Start: 2017-09-21

## 2017-09-21 NOTE — TELEPHONE ENCOUNTER
Refill request- lisinopril 20mg, # 60    Last visit- 04/05/17   Last filled- 08/18/17    Med pended for approval; please advise*

## 2017-12-22 RX ORDER — AMLODIPINE BESYLATE 5 MG/1
TABLET ORAL
Qty: 90 TABLET | Refills: 0 | Status: SHIPPED | OUTPATIENT
Start: 2017-12-22 | End: 2018-10-24

## 2017-12-22 RX ORDER — METOPROLOL TARTRATE 50 MG/1
TABLET ORAL
Qty: 180 TABLET | Refills: 0 | Status: SHIPPED | OUTPATIENT
Start: 2017-12-22 | End: 2018-01-29

## 2017-12-22 RX ORDER — METOPROLOL SUCCINATE 50 MG/1
TABLET, EXTENDED RELEASE ORAL
Qty: 180 TABLET | Refills: 0 | Status: SHIPPED | OUTPATIENT
Start: 2017-12-22 | End: 2018-10-24

## 2017-12-22 NOTE — TELEPHONE ENCOUNTER
----- Message from Leonor Meredith sent at 12/22/2017  1:34 PM CST -----  Contact: Josefina Rocha with [employer]  [phone] is calling/What strength of Metoprolol is correct/Extended Release or Regular?/please advise

## 2017-12-22 NOTE — TELEPHONE ENCOUNTER
I called the patient to verify dose with patient and called the [facility] to let them know patient is currently taking the Lopressor BID

## 2018-01-29 ENCOUNTER — OFFICE VISIT (OUTPATIENT)
Dept: RHEUMATOLOGY | Facility: CLINIC | Age: 73
End: 2018-01-29
Payer: MEDICARE

## 2018-01-29 VITALS
HEIGHT: 69 IN | DIASTOLIC BLOOD PRESSURE: 80 MMHG | BODY MASS INDEX: 27.4 KG/M2 | SYSTOLIC BLOOD PRESSURE: 140 MMHG | WEIGHT: 185 LBS

## 2018-01-29 DIAGNOSIS — M15.9 OSTEOARTHRITIS, GENERALIZED: Primary | ICD-10-CM

## 2018-01-29 PROCEDURE — 99204 OFFICE O/P NEW MOD 45 MIN: CPT | Mod: ,,, | Performed by: INTERNAL MEDICINE

## 2018-01-29 RX ORDER — DICLOFENAC SODIUM 10 MG/G
2 GEL TOPICAL 3 TIMES DAILY
Qty: 1 TUBE | Refills: 3 | Status: SHIPPED | OUTPATIENT
Start: 2018-01-29 | End: 2018-10-24

## 2018-01-29 NOTE — PROGRESS NOTES
[facility] RHEUMATOLOGY        NEW PATIENT      Subjective:       Patient ID:   NAME: Demarco Mercado : 1945     73 y.o. male    Referring Doc: No ref. provider found  Other Physicians:    Chief Complaint:  Consult (new patient - referred by wife) and Pain (right shoulder pain)      History of Present Illness:     New patient with hx of r shoulder pain for last 3 months,on and off.Initially with increased pain with abduction  At present with mild pain along with neck pain, r sided.Very minimal pain,at present  No paresthesias.  No hx of trauma.          ROS:   GEN: no fevers night sweats or significant weight changes   - fatigue  HEENT: occ HA's, No changes in vision , no mouth ulcers, no sicca symptoms, no scalp tenderness, jaw claudication  CV: no CP, SOB, PND, THEODORE or orthopnea,no palpitations  PULM:no SOB, cough, hemoptysis, sputum or pleuritic pain  GI: no abdominal pain, nausea, vomiting, constipation, diarrhea, melanotic stools, BRBPR, or hematemesis, no dysphagia  : no hematuria, dysuria  NEURO:no paresthesias, headaches, visual disturbances, muscle weakness  SKIN:  no rashes , erythema, bruising, or swelling, no Raynauds, no photosensitivity  MUSCULOSKELETAL:no joint swelling, no prolonged AM stiffness, no back pain   PSYCH:   - Insomnia, -  depression, -anxiety    Medications:    Current Outpatient Prescriptions:     alprazolam (XANAX) 1 MG tablet, TK 1 TO 2 TS PO HS PRF ANXIETY, Disp: , Rfl: 0    amLODIPine (NORVASC) 5 MG tablet, TAKE 1 TABLET BY MOUTH EVERY EVENING, Disp: 90 tablet, Rfl: 0    aspirin (ECOTRIN) 325 MG EC tablet, Take 325 mg by mouth once daily., Disp: , Rfl:     atorvastatin (LIPITOR) 20 MG tablet, Take 20 mg by mouth once daily., Disp: , Rfl:     esomeprazole (NEXIUM) 40 MG capsule, Take 40 mg by mouth before breakfast. TAKE AM OF SURGERY, Disp: , Rfl:     fenofibrate micronized (LOFIBRA) 134 MG Cap, Take 134 mg by mouth daily with breakfast., Disp: , Rfl:     lisinopril  "(PRINIVIL,ZESTRIL) 40 MG tablet, Take 1 tablet (40 mg total) by mouth every evening., Disp: 90 tablet, Rfl: 1    metoprolol succinate (TOPROL-XL) 50 MG 24 hr tablet, TAKE 1 TABLET BY MOUTH TWO TIMES DAILY. 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    Physical Examination:   GEN: no apparent distress, comfortable; AAOx3  SKIN: no rashes, no lesions, no sclerodactyly or induration, no Raynaud's, no periungual erythema  HEAD: normal  EYES: no pallor, no icterus,  NECK: no masses, thyroid normal, trachea midline, no LAD/LN's, supple  CV:   S1 and S2 regular, no murmurs, gallop or rubs  CHEST: Normal respiratory effort;  normal breath sounds; no rubs, no wheezes, no crackles.   ABDOM: nontender and nondistended; soft; ; no rebound/guarding,no masses  MUSC/Skeletal: ROM normal; no crepitus; joints without synovitis, no deformities   EXTREM: no clubbing, cyanosis, edema, normal pulses.  NEURO: grossly intact; motorWNL; AAOx3; no tremors  PSYCH: normal mood, affect and behavior  LYMPH: normal cervical, supraclavicular            Labs:   @RESUFAST(WBC,HGB,HCT,MCV,PLT)  )@RESUFAST(NA,K,CL,CO2,GLU,BUN,Creatinine,Calcium,PROT,Albumin,Bilitot,Alkphos,AST,ALT,GIOVANY,Sed Rate,CRP,RF,CCP)      Radiology/Diagnostic Studies:    I have reviewed all available labs and XRay reports    Assessment/Plan:   73 y.o. male with what sounds like  Rsubacromial bursitis.Doing well at present  PLAN: Voltaren gel prn  Call if recurrence of sxs,he will be worked in for an injection ,if needed        PLAN:          Discussion:     I have explained all of the above in detail and the " patient understands all of the current recommendation(s). I have answered all of their questions to the best of my ability and to their complete satisfaction.      I have reviewed the risks and benefits of the medication in detail with patient, who understands and wishes to proceed. Printed information regarding the disease and/or medication was also provided.        RTC        Electronically signed by Soumya Fan MD

## 2018-02-22 ENCOUNTER — OFFICE VISIT (OUTPATIENT)
Dept: RHEUMATOLOGY | Facility: CLINIC | Age: 73
End: 2018-02-22
Payer: MEDICARE

## 2018-02-22 VITALS
HEIGHT: 69 IN | WEIGHT: 184 LBS | BODY MASS INDEX: 27.25 KG/M2 | SYSTOLIC BLOOD PRESSURE: 170 MMHG | DIASTOLIC BLOOD PRESSURE: 82 MMHG

## 2018-02-22 DIAGNOSIS — M75.51 BURSITIS OF SHOULDER, RIGHT: Primary | ICD-10-CM

## 2018-02-22 PROCEDURE — 1159F MED LIST DOCD IN RCRD: CPT | Mod: ,,, | Performed by: INTERNAL MEDICINE

## 2018-02-22 PROCEDURE — 1125F AMNT PAIN NOTED PAIN PRSNT: CPT | Mod: ,,, | Performed by: INTERNAL MEDICINE

## 2018-02-22 PROCEDURE — 99213 OFFICE O/P EST LOW 20 MIN: CPT | Mod: 25,,, | Performed by: INTERNAL MEDICINE

## 2018-02-22 PROCEDURE — 20610 DRAIN/INJ JOINT/BURSA W/O US: CPT | Mod: RT,,, | Performed by: INTERNAL MEDICINE

## 2018-02-22 RX ORDER — TRAMADOL HYDROCHLORIDE 50 MG/1
TABLET ORAL
Refills: 0 | COMMUNITY
Start: 2017-11-17 | End: 2018-03-07 | Stop reason: SDUPTHER

## 2018-02-22 RX ORDER — TRIAMCINOLONE ACETONIDE 40 MG/ML
40 INJECTION, SUSPENSION INTRA-ARTICULAR; INTRAMUSCULAR
Status: DISCONTINUED | OUTPATIENT
Start: 2018-02-22 | End: 2018-02-22 | Stop reason: HOSPADM

## 2018-02-22 RX ORDER — GABAPENTIN 300 MG/1
CAPSULE ORAL
Refills: 1 | COMMUNITY
Start: 2018-01-18 | End: 2018-10-24

## 2018-02-22 RX ORDER — TIZANIDINE 4 MG/1
4 TABLET ORAL EVERY 8 HOURS PRN
Refills: 0 | COMMUNITY
Start: 2017-11-27

## 2018-02-22 RX ORDER — DEXAMETHASONE SODIUM PHOSPHATE 4 MG/ML
2 INJECTION, SOLUTION INTRA-ARTICULAR; INTRALESIONAL; INTRAMUSCULAR; INTRAVENOUS; SOFT TISSUE
Status: DISCONTINUED | OUTPATIENT
Start: 2018-02-22 | End: 2018-02-22 | Stop reason: HOSPADM

## 2018-02-22 RX ADMIN — DEXAMETHASONE SODIUM PHOSPHATE 2 MG: 4 INJECTION, SOLUTION INTRA-ARTICULAR; INTRALESIONAL; INTRAMUSCULAR; INTRAVENOUS; SOFT TISSUE at 04:02

## 2018-02-22 RX ADMIN — TRIAMCINOLONE ACETONIDE 40 MG: 40 INJECTION, SUSPENSION INTRA-ARTICULAR; INTRAMUSCULAR at 04:02

## 2018-02-22 NOTE — PROCEDURES
Large Joint Aspiration/Injection  Date/Time: 2/22/2018 4:16 PM  Performed by: TRELL HALL  Authorized by: TRELL HALL     Consent Done?:  Not Needed  Indications:  Pain    Location:  Shoulder  Site:  R subacromial bursa  Medications:  40 mg triamcinolone acetonide 40 mg/mL; 2 mg dexamethasone 4 mg/mL  Patient tolerance:  Patient tolerated the procedure well with no immediate complications    Injected 1 cc Kenalog,1 cc Dexamethasone and 2 cc Lidocaine

## 2018-02-22 NOTE — PROGRESS NOTES
"       [facility] RHEUMATOLOGY            PROGRESS NOTE      Subjective:       Patient ID:   NAME: Demarco Mercado : 1945     73 y.o. male    Referring Doc: No ref. provider found  Other Physicians:    Chief Complaint:  No chief complaint on file.      History of Present Illness:     Patient returns today for a" worked in"visit for  right shoulder pain     The patient has been experiencing pain in the right shoulder with abduction for the past 5 days or so; no history of trauma. No paresthesias. No chest pains cough or shortness of breath. No joint swelling. No fevers.            ROS:   GEN:  No  fever, night sweats . weight is stable   No fatigue  SKIN: no rashes, no bruising, no ulcerations, no Raynaud's  HEENT: no HA's, No visual changes, no mucosal ulcers, no sicca symptoms,  CV:   no CP, SOB, PND, THEODORE, no orthopnea, no palpitations  PULM: normal with no SOB, cough, hemoptysis, sputum or pleuritic pain  GI:  no abdominal pain, nausea, vomiting, constipation, diarrhea, melanotic stools, BRBPR, hematemesis, no dysphagia  :   no dysuria  NEURO: no paresthesias, headaches, visual disturbances, muscle weakness  MUSCULOSKELETAL:no joint swelling, prolonged AM stiffness, no back pain, no muscle pain  Allergies:  Review of patient's allergies indicates:  No Known Allergies    Medications:    Current Outpatient Prescriptions:     alprazolam (XANAX) 1 MG tablet, TK 1 TO 2 TS PO HS PRF ANXIETY, Disp: , Rfl: 0    amLODIPine (NORVASC) 5 MG tablet, TAKE 1 TABLET BY MOUTH EVERY EVENING, Disp: 90 tablet, Rfl: 0    aspirin (ECOTRIN) 325 MG EC tablet, Take 325 mg by mouth once daily., Disp: , Rfl:     atorvastatin (LIPITOR) 20 MG tablet, Take 20 mg by mouth once daily., Disp: , Rfl:     diclofenac sodium 1 % Gel, Apply 2 g topically 3 (three) times daily. Apply  2 gm to r shoulder bid-tid prn, Disp: 1 Tube, Rfl: 3    esomeprazole (NEXIUM) 40 MG capsule, Take 40 mg by mouth before breakfast. TAKE AM OF SURGERY, Disp: , " "Rfl:     fenofibrate micronized (LOFIBRA) 134 MG Cap, Take 134 mg by mouth daily with breakfast., Disp: , Rfl:     gabapentin (NEURONTIN) 300 MG capsule, TK 1 C PO BID, Disp: , Rfl: 1    lisinopril (PRINIVIL,ZESTRIL) 40 MG tablet, Take 1 tablet (40 mg total) by mouth every evening., Disp: 90 tablet, Rfl: 1    metoprolol succinate (TOPROL-XL) 50 MG 24 hr tablet, TAKE 1 TABLET BY MOUTH TWO TIMES DAILY. TAKE MORNING OF SURGERY, Disp: 180 tablet, Rfl: 0    tiZANidine (ZANAFLEX) 4 MG tablet, TK 1 T PO Q 8 H PRF PAIN, Disp: , Rfl: 0    traMADol (ULTRAM) 50 mg tablet, TK 1 T PO TID, Disp: , Rfl: 0    PMHx/PSHx Updates:        Objective:     Vitals:  Blood pressure (!) 170/82, height 5' 9" (1.753 m), weight 83.5 kg (184 lb).    Physical Examination:   GEN: no apparent distress, comfortable; AAOx3  SKIN: no rashes,no ulceration, no Raynaud's, no petechiae, no SQ nodules,  HEAD: normal  EYES: no pallor, no icterus, NECK: no masses, thyroid normal, trachea midline, no LAD/LN's, supple  CV: RRR with no murmur; l S1 and S2 reg. ,no gallop no rubs,   CHEST: Normal respiratory effort; CTAB; normal breath sounds; no wheeze or crackles  MUSC/Skeletal: ROM normal; no crepitus; joints without synovitis,  no deformities  No joint swelling or tenderness of PIP, MCP, wrist, elbow, shoulder, or knee jointsRight shoulder without swelling. No erythema Abduction up to 45° ,internal and external rotation preserved, neg drop arm  EXTREM: no clubbing, cyanosis, no edema,normal  pulses   NEURO: grossly intact; motor WNL; AAOx3; ,  PSYCH: normal mood, affect and behavior  LYMPH: normal cervical, supraclavicular          Labs:   Lab Results   Component Value Date    WBC 5.86 10/06/2017    HGB 12.0 (L) 10/06/2017    HCT 38.2 (L) 10/06/2017    MCV 89 10/06/2017     (H) 10/06/2017    CMP  @LASTLAB(NA,K,CL,CO2,GLU,BUN,Creatinine,Calcium,PROT,Albumin,Bilitot,Alkphos,AST,ALT,CRP,ESR,RF,CCP,GIOVANY,SSA,CPK,uric acid) )@  I have reviewed all " available lab results and radiology reports.    Radiology/Diagnostic Studies:        Assessment/Plan:   (1) 73 y.o. male with diagnosis of Right subacromial bursitis. This was injected as per procedure note  2) osteoarthritis  Follow-up in 1 week if still symptomatic  3) elevated blood pressure .Patient monitors it at home and it has been normal. He thinks it might be due to pain.. He is to continue monitoring blood  pressure and if persistently high contact primary care physician      Discussion:     I have explained all of the above in detail and the patient understands all of the current recommendation(s). I have answered all questions to the best of my ability and to their complete satisfaction.       The patient is to continue with the current management plan         RTC in 1 week if still symptomatic        Electronically signed by Soumya Fan MD

## 2018-03-07 DIAGNOSIS — M25.511 PAIN IN JOINT OF RIGHT SHOULDER: Primary | ICD-10-CM

## 2018-03-07 RX ORDER — TRAMADOL HYDROCHLORIDE 50 MG/1
TABLET ORAL
Qty: 90 TABLET | Refills: 0 | Status: SHIPPED | OUTPATIENT
Start: 2018-03-07 | End: 2018-06-10 | Stop reason: SDUPTHER

## 2018-03-20 ENCOUNTER — TELEPHONE (OUTPATIENT)
Dept: RHEUMATOLOGY | Facility: CLINIC | Age: 73
End: 2018-03-20

## 2018-03-20 NOTE — TELEPHONE ENCOUNTER
----- Message from Soumya Fan MD sent at 3/20/2018  4:07 PM CDT -----  Please call the patient regarding his abnormal result.MRI of shoulder showed a small tendon tear and tendinitis.Might benefit from Physical Therapy.Can order it  now or wait until his next appt with me ( if soon)

## 2018-04-01 RX ORDER — METOPROLOL TARTRATE 50 MG/1
TABLET ORAL
Qty: 180 TABLET | Refills: 0 | Status: SHIPPED | OUTPATIENT
Start: 2018-04-01 | End: 2018-10-24

## 2018-04-01 RX ORDER — LISINOPRIL 40 MG/1
TABLET ORAL
Qty: 90 TABLET | Refills: 0 | Status: SHIPPED | OUTPATIENT
Start: 2018-04-01

## 2018-06-11 RX ORDER — TRAMADOL HYDROCHLORIDE 50 MG/1
TABLET ORAL
Qty: 90 TABLET | Refills: 0 | Status: SHIPPED | OUTPATIENT
Start: 2018-06-11 | End: 2018-10-24

## 2018-08-06 RX ORDER — TRAMADOL HYDROCHLORIDE 50 MG/1
TABLET ORAL
Qty: 90 TABLET | Refills: 0 | OUTPATIENT
Start: 2018-08-06

## 2018-08-30 RX ORDER — LISINOPRIL 40 MG/1
TABLET ORAL
Qty: 90 TABLET | Refills: 0 | OUTPATIENT
Start: 2018-08-30

## 2018-09-05 RX ORDER — METOPROLOL TARTRATE 50 MG/1
TABLET ORAL
Qty: 180 TABLET | Refills: 0 | OUTPATIENT
Start: 2018-09-05

## 2018-11-06 PROBLEM — M70.21 OLECRANON BURSITIS OF RIGHT ELBOW: Status: ACTIVE | Noted: 2018-11-06

## 2021-07-12 ENCOUNTER — OFFICE VISIT (OUTPATIENT)
Dept: CARDIOLOGY | Facility: CLINIC | Age: 76
End: 2021-07-12
Payer: MEDICARE

## 2021-07-12 VITALS
HEIGHT: 67 IN | DIASTOLIC BLOOD PRESSURE: 78 MMHG | BODY MASS INDEX: 28.93 KG/M2 | SYSTOLIC BLOOD PRESSURE: 140 MMHG | WEIGHT: 184.31 LBS | HEART RATE: 60 BPM

## 2021-07-12 DIAGNOSIS — E78.5 DYSLIPIDEMIA: ICD-10-CM

## 2021-07-12 DIAGNOSIS — Z95.3 STATUS POST MITRAL VALVE REPLACEMENT WITH BIOPROSTHETIC VALVE: ICD-10-CM

## 2021-07-12 DIAGNOSIS — I25.10 CORONARY ARTERY DISEASE INVOLVING NATIVE CORONARY ARTERY OF NATIVE HEART WITHOUT ANGINA PECTORIS: Primary | ICD-10-CM

## 2021-07-12 PROCEDURE — 99213 OFFICE O/P EST LOW 20 MIN: CPT | Mod: PBBFAC,PO | Performed by: INTERNAL MEDICINE

## 2021-07-12 PROCEDURE — 99999 PR PBB SHADOW E&M-EST. PATIENT-LVL III: CPT | Mod: PBBFAC,,, | Performed by: INTERNAL MEDICINE

## 2021-07-12 PROCEDURE — 99204 PR OFFICE/OUTPT VISIT, NEW, LEVL IV, 45-59 MIN: ICD-10-PCS | Mod: S$PBB,,, | Performed by: INTERNAL MEDICINE

## 2021-07-12 PROCEDURE — 99204 OFFICE O/P NEW MOD 45 MIN: CPT | Mod: S$PBB,,, | Performed by: INTERNAL MEDICINE

## 2021-07-12 PROCEDURE — 99999 PR PBB SHADOW E&M-EST. PATIENT-LVL III: ICD-10-PCS | Mod: PBBFAC,,, | Performed by: INTERNAL MEDICINE

## 2021-07-12 RX ORDER — POTASSIUM CHLORIDE 750 MG/1
10 TABLET, EXTENDED RELEASE ORAL 2 TIMES DAILY
COMMUNITY
End: 2021-10-24 | Stop reason: CLARIF

## 2021-07-12 RX ORDER — PRAMIPEXOLE DIHYDROCHLORIDE 0.25 MG/1
0.25 TABLET ORAL NIGHTLY
COMMUNITY

## 2021-07-12 RX ORDER — HYDRALAZINE HYDROCHLORIDE 50 MG/1
50 TABLET, FILM COATED ORAL 2 TIMES DAILY
COMMUNITY

## 2021-07-12 RX ORDER — AMLODIPINE BESYLATE 10 MG/1
10 TABLET ORAL DAILY
COMMUNITY

## 2021-08-19 ENCOUNTER — CLINICAL SUPPORT (OUTPATIENT)
Dept: CARDIOLOGY | Facility: HOSPITAL | Age: 76
End: 2021-08-19
Attending: INTERNAL MEDICINE
Payer: MEDICARE

## 2021-08-19 VITALS — BODY MASS INDEX: 28.88 KG/M2 | HEIGHT: 67 IN | WEIGHT: 184 LBS

## 2021-08-19 DIAGNOSIS — I25.10 CORONARY ARTERY DISEASE INVOLVING NATIVE CORONARY ARTERY OF NATIVE HEART WITHOUT ANGINA PECTORIS: ICD-10-CM

## 2021-08-19 DIAGNOSIS — Z95.3 STATUS POST MITRAL VALVE REPLACEMENT WITH BIOPROSTHETIC VALVE: ICD-10-CM

## 2021-08-19 DIAGNOSIS — E78.5 DYSLIPIDEMIA: ICD-10-CM

## 2021-08-19 PROCEDURE — 93306 TTE W/DOPPLER COMPLETE: CPT | Mod: PO

## 2021-08-19 PROCEDURE — 93306 ECHO (CUPID ONLY): ICD-10-PCS | Mod: 26,,, | Performed by: INTERNAL MEDICINE

## 2021-08-19 PROCEDURE — 93306 TTE W/DOPPLER COMPLETE: CPT | Mod: 26,,, | Performed by: INTERNAL MEDICINE

## 2021-08-20 LAB
ASCENDING AORTA: 3.21 CM
AV INDEX (PROSTH): 0.9
AV MEAN GRADIENT: 5 MMHG
AV PEAK GRADIENT: 9 MMHG
AV VALVE AREA: 4.59 CM2
AV VELOCITY RATIO: 0.89
BSA FOR ECHO PROCEDURE: 1.99 M2
CV ECHO LV RWT: 0.38 CM
DOP CALC AO PEAK VEL: 1.52 M/S
DOP CALC AO VTI: 39.59 CM
DOP CALC LVOT AREA: 5.1 CM2
DOP CALC LVOT DIAMETER: 2.55 CM
DOP CALC LVOT PEAK VEL: 1.36 M/S
DOP CALC LVOT STROKE VOLUME: 181.82 CM3
DOP CALCLVOT PEAK VEL VTI: 35.62 CM
E WAVE DECELERATION TIME: 543.79 MSEC
E/A RATIO: 1.25
E/E' RATIO: 48.22 M/S
ECHO LV POSTERIOR WALL: 1.07 CM (ref 0.6–1.1)
EJECTION FRACTION: 55 %
FRACTIONAL SHORTENING: 40 % (ref 28–44)
INTERVENTRICULAR SEPTUM: 1.15 CM (ref 0.6–1.1)
LA MAJOR: 6.96 CM
LA MINOR: 6.78 CM
LA WIDTH: 5.14 CM
LEFT ATRIUM SIZE: 5.25 CM
LEFT ATRIUM VOLUME INDEX: 80.8 ML/M2
LEFT ATRIUM VOLUME: 157.55 CM3
LEFT INTERNAL DIMENSION IN SYSTOLE: 3.39 CM (ref 2.1–4)
LEFT VENTRICLE DIASTOLIC VOLUME INDEX: 81.79 ML/M2
LEFT VENTRICLE DIASTOLIC VOLUME: 159.49 ML
LEFT VENTRICLE MASS INDEX: 133 G/M2
LEFT VENTRICLE SYSTOLIC VOLUME INDEX: 24.1 ML/M2
LEFT VENTRICLE SYSTOLIC VOLUME: 47.08 ML
LEFT VENTRICULAR INTERNAL DIMENSION IN DIASTOLE: 5.69 CM (ref 3.5–6)
LEFT VENTRICULAR MASS: 259.1 G
LV LATERAL E/E' RATIO: 54.25 M/S
LV SEPTAL E/E' RATIO: 43.4 M/S
MV MEAN GRADIENT: 3 MMHG
MV PEAK A VEL: 1.74 M/S
MV PEAK E VEL: 2.17 M/S
MV PEAK GRADIENT: 36 MMHG
MV STENOSIS PRESSURE HALF TIME: 157.7 MS
MV VALVE AREA P 1/2 METHOD: 1.4 CM2
PISA MRMAX VEL: 0.05 M/S
PISA TR MAX VEL: 3.06 M/S
RA MAJOR: 5.67 CM
RA PRESSURE: 15 MMHG
RA WIDTH: 4.36 CM
RIGHT VENTRICULAR END-DIASTOLIC DIMENSION: 5.39 CM
RV TISSUE DOPPLER FREE WALL SYSTOLIC VELOCITY 1 (APICAL 4 CHAMBER VIEW): 6.83 CM/S
SINUS: 3.45 CM
STJ: 2.73 CM
TDI LATERAL: 0.04 M/S
TDI SEPTAL: 0.05 M/S
TDI: 0.05 M/S
TR MAX PG: 37 MMHG
TRICUSPID ANNULAR PLANE SYSTOLIC EXCURSION: 1.55 CM
TV REST PULMONARY ARTERY PRESSURE: 52 MMHG

## 2021-08-26 ENCOUNTER — OFFICE VISIT (OUTPATIENT)
Dept: CARDIOLOGY | Facility: CLINIC | Age: 76
End: 2021-08-26
Payer: MEDICARE

## 2021-08-26 VITALS
BODY MASS INDEX: 27.6 KG/M2 | HEART RATE: 49 BPM | DIASTOLIC BLOOD PRESSURE: 67 MMHG | SYSTOLIC BLOOD PRESSURE: 133 MMHG | WEIGHT: 186.31 LBS | HEIGHT: 69 IN

## 2021-08-26 DIAGNOSIS — Z95.3 STATUS POST MITRAL VALVE REPLACEMENT WITH BIOPROSTHETIC VALVE: ICD-10-CM

## 2021-08-26 DIAGNOSIS — I10 ESSENTIAL HYPERTENSION: ICD-10-CM

## 2021-08-26 DIAGNOSIS — R29.898 LEG WEAKNESS, BILATERAL: Primary | ICD-10-CM

## 2021-08-26 DIAGNOSIS — I25.10 CORONARY ARTERY DISEASE INVOLVING NATIVE CORONARY ARTERY OF NATIVE HEART WITHOUT ANGINA PECTORIS: ICD-10-CM

## 2021-08-26 DIAGNOSIS — E78.5 DYSLIPIDEMIA: ICD-10-CM

## 2021-08-26 PROCEDURE — 99999 PR PBB SHADOW E&M-EST. PATIENT-LVL III: CPT | Mod: PBBFAC,,, | Performed by: INTERNAL MEDICINE

## 2021-08-26 PROCEDURE — 99214 OFFICE O/P EST MOD 30 MIN: CPT | Mod: S$PBB,,, | Performed by: INTERNAL MEDICINE

## 2021-08-26 PROCEDURE — 99213 OFFICE O/P EST LOW 20 MIN: CPT | Mod: PBBFAC,PO | Performed by: INTERNAL MEDICINE

## 2021-08-26 PROCEDURE — 99214 PR OFFICE/OUTPT VISIT, EST, LEVL IV, 30-39 MIN: ICD-10-PCS | Mod: S$PBB,,, | Performed by: INTERNAL MEDICINE

## 2021-08-26 PROCEDURE — 99999 PR PBB SHADOW E&M-EST. PATIENT-LVL III: ICD-10-PCS | Mod: PBBFAC,,, | Performed by: INTERNAL MEDICINE

## 2021-09-15 ENCOUNTER — CLINICAL SUPPORT (OUTPATIENT)
Dept: CARDIOLOGY | Facility: HOSPITAL | Age: 76
End: 2021-09-15
Attending: INTERNAL MEDICINE
Payer: MEDICARE

## 2021-09-15 ENCOUNTER — IMMUNIZATION (OUTPATIENT)
Dept: FAMILY MEDICINE | Facility: CLINIC | Age: 76
End: 2021-09-15
Payer: MEDICARE

## 2021-09-15 DIAGNOSIS — Z23 NEED FOR VACCINATION: Primary | ICD-10-CM

## 2021-09-15 DIAGNOSIS — R29.898 LEG WEAKNESS, BILATERAL: ICD-10-CM

## 2021-09-15 PROCEDURE — 93925 LOWER EXTREMITY STUDY: CPT | Mod: 26,,, | Performed by: INTERNAL MEDICINE

## 2021-09-15 PROCEDURE — 0001A COVID-19, MRNA, LNP-S, PF, 30 MCG/0.3 ML DOSE VACCINE: CPT | Mod: CV19,,, | Performed by: FAMILY MEDICINE

## 2021-09-15 PROCEDURE — 93925 CV US DOPPLER ARTERIAL LEGS BILATERAL (CUPID ONLY): ICD-10-PCS | Mod: 26,,, | Performed by: INTERNAL MEDICINE

## 2021-09-15 PROCEDURE — 0001A COVID-19, MRNA, LNP-S, PF, 30 MCG/0.3 ML DOSE VACCINE: ICD-10-PCS | Mod: CV19,,, | Performed by: FAMILY MEDICINE

## 2021-09-15 PROCEDURE — 93925 LOWER EXTREMITY STUDY: CPT | Mod: PO

## 2021-09-15 PROCEDURE — 91300 COVID-19, MRNA, LNP-S, PF, 30 MCG/0.3 ML DOSE VACCINE: CPT | Mod: ,,, | Performed by: FAMILY MEDICINE

## 2021-09-15 PROCEDURE — 91300 COVID-19, MRNA, LNP-S, PF, 30 MCG/0.3 ML DOSE VACCINE: ICD-10-PCS | Mod: ,,, | Performed by: FAMILY MEDICINE

## 2021-09-16 LAB
LEFT ANT TIBIAL SYS PSV: 59 CM/S
LEFT CFA PSV: 93 CM/S
LEFT PERONEAL SYS PSV: 78 CM/S
LEFT POPLITEAL PSV: 105 CM/S
LEFT POST TIBIAL SYS PSV: 105 CM/S
LEFT PROFUNDA SYS PSV: 95 CM/S
LEFT SUPER FEMORAL DIST SYS PSV: 78 CM/S
LEFT SUPER FEMORAL MID SYS PSV: 110 CM/S
LEFT SUPER FEMORAL OSTIAL SYS PSV: 77 CM/S
LEFT SUPER FEMORAL PROX SYS PSV: 82 CM/S
LEFT TIB/PER TRUNK SYS PSV: 86 CM/S
OHS CV LEFT LOWER EXTREMITY ABI (NO CALC): 1
OHS CV RIGHT ABI LOWER EXTREMITY (NO CALC): 1
RIGHT ANT TIBIAL SYS PSV: 64 CM/S
RIGHT CFA PSV: 91 CM/S
RIGHT PERONEAL SYS PSV: 71 CM/S
RIGHT POPLITEAL PSV: 88 CM/S
RIGHT POST TIBIAL SYS PSV: 144 CM/S
RIGHT PROFUNDA SYS PSV: 81 CM/S
RIGHT SUPER FEMORAL DIST SYS PSV: 88 CM/S
RIGHT SUPER FEMORAL MID SYS PSV: 112 CM/S
RIGHT SUPER FEMORAL OSTIAL SYS PSV: 107 CM/S
RIGHT SUPER FEMORAL PROX SYS PSV: 98 CM/S
RIGHT TIB/PER TRUNK SYS PSV: 138 CM/S

## 2021-10-06 ENCOUNTER — IMMUNIZATION (OUTPATIENT)
Dept: FAMILY MEDICINE | Facility: CLINIC | Age: 76
End: 2021-10-06
Payer: MEDICARE

## 2021-10-06 DIAGNOSIS — Z23 NEED FOR VACCINATION: Primary | ICD-10-CM

## 2021-10-06 PROCEDURE — 91300 COVID-19, MRNA, LNP-S, PF, 30 MCG/0.3 ML DOSE VACCINE: CPT | Mod: PBBFAC,PO

## 2021-10-06 PROCEDURE — 0002A COVID-19, MRNA, LNP-S, PF, 30 MCG/0.3 ML DOSE VACCINE: CPT | Mod: PBBFAC | Performed by: FAMILY MEDICINE

## 2021-10-24 PROBLEM — T14.90XA TRAUMA: Status: ACTIVE | Noted: 2021-10-24

## 2021-10-25 PROBLEM — M79.89 SWELLING OF THIGH: Status: ACTIVE | Noted: 2021-10-25

## 2021-10-25 PROBLEM — S92.109A TALUS FRACTURE: Status: ACTIVE | Noted: 2021-10-25

## 2021-11-10 DIAGNOSIS — M25.571 ACUTE RIGHT ANKLE PAIN: Primary | ICD-10-CM

## 2021-11-19 ENCOUNTER — EXTERNAL HOME HEALTH (OUTPATIENT)
Dept: HOME HEALTH SERVICES | Facility: HOSPITAL | Age: 76
End: 2021-11-19
Payer: MEDICARE

## 2021-12-28 ENCOUNTER — TELEPHONE (OUTPATIENT)
Dept: CARDIOLOGY | Facility: CLINIC | Age: 76
End: 2021-12-28
Payer: MEDICARE